# Patient Record
Sex: FEMALE | Race: ASIAN | NOT HISPANIC OR LATINO | URBAN - METROPOLITAN AREA
[De-identification: names, ages, dates, MRNs, and addresses within clinical notes are randomized per-mention and may not be internally consistent; named-entity substitution may affect disease eponyms.]

---

## 2020-05-05 ENCOUNTER — NURSE TRIAGE (OUTPATIENT)
Dept: NURSING | Facility: CLINIC | Age: 25
End: 2020-05-05

## 2020-05-05 NOTE — TELEPHONE ENCOUNTER
She has not been able to urinate fully since around 6 am this morning. She started feeling pain with urination around 10 am and since then she has only been able to urinate a little each time due to pain. She rates the pain a 7 and she says her bladder is very full, and she has been drinking a lot of water today. She is going to [facility] to get evaluated.    Madalyn Cat RN/ [facility]        Reason for Disposition    [1] Unable to urinate (or only a few drops) > 4 hours AND     [2] bladder feels very full (e.g., palpable bladder or strong urge to urinate)    Protocols used: URINARY SYMPTOMS-A-AH

## 2020-06-16 ENCOUNTER — RECORDS - HEALTHEAST (OUTPATIENT)
Dept: ADMINISTRATIVE | Facility: OTHER | Age: 25
End: 2020-06-16

## 2020-06-17 ENCOUNTER — HOSPITAL ENCOUNTER (OUTPATIENT)
Dept: MAMMOGRAPHY | Facility: CLINIC | Age: 25
Discharge: HOME | End: 2020-06-17
Attending: OBSTETRICS & GYNECOLOGY

## 2020-06-17 DIAGNOSIS — N63.10 BREAST MASS, RIGHT: ICD-10-CM

## 2021-03-18 ENCOUNTER — RECORDS - HEALTHEAST (OUTPATIENT)
Dept: ADMINISTRATIVE | Facility: OTHER | Age: 26
End: 2021-03-18

## 2021-03-18 ENCOUNTER — AMBULATORY - HEALTHEAST (OUTPATIENT)
Dept: SURGERY | Facility: AMBULATORY SURGERY CENTER | Age: 26
End: 2021-03-18

## 2021-03-18 DIAGNOSIS — Z11.59 ENCOUNTER FOR SCREENING FOR OTHER VIRAL DISEASES: ICD-10-CM

## 2021-04-19 ENCOUNTER — AMBULATORY - HEALTHEAST (OUTPATIENT)
Dept: LAB | Facility: CLINIC | Age: 26
End: 2021-04-19

## 2021-04-19 DIAGNOSIS — Z11.59 ENCOUNTER FOR SCREENING FOR OTHER VIRAL DISEASES: ICD-10-CM

## 2021-04-21 ENCOUNTER — COMMUNICATION - HEALTHEAST (OUTPATIENT)
Dept: SCHEDULING | Facility: CLINIC | Age: 26
End: 2021-04-21

## 2021-04-21 ENCOUNTER — RECORDS - HEALTHEAST (OUTPATIENT)
Dept: ADMINISTRATIVE | Facility: OTHER | Age: 26
End: 2021-04-21

## 2021-04-21 ASSESSMENT — MIFFLIN-ST. JEOR: SCORE: 1696.16

## 2021-04-22 ENCOUNTER — ANESTHESIA - HEALTHEAST (OUTPATIENT)
Dept: SURGERY | Facility: AMBULATORY SURGERY CENTER | Age: 26
End: 2021-04-22

## 2021-04-23 ENCOUNTER — SURGERY - HEALTHEAST (OUTPATIENT)
Dept: SURGERY | Facility: AMBULATORY SURGERY CENTER | Age: 26
End: 2021-04-23
Payer: MEDICAID

## 2021-06-05 VITALS
BODY MASS INDEX: 38.04 KG/M2 | BODY MASS INDEX: 40.24 KG/M2 | WEIGHT: 220 LBS | BODY MASS INDEX: 40.24 KG/M2 | BODY MASS INDEX: 38.04 KG/M2 | HEIGHT: 62 IN | WEIGHT: 220 LBS | HEIGHT: 62 IN

## 2021-07-04 NOTE — ADDENDUM NOTE
Addendum Note by David Evans LPN at 3/18/2021  1:13 PM     Author: David Evans LPN Service: -- Author Type: Licensed Nurse    Filed: 3/24/2021 10:21 AM Encounter Date: 3/18/2021 Status: Signed    : David Evans LPN (Licensed Nurse)    Addended by: KYUNG EVANS on: 3/24/2021 10:21 AM        Modules accepted: Orders

## 2022-01-12 VITALS — BODY MASS INDEX: 40.48 KG/M2 | HEIGHT: 62 IN | WEIGHT: 220 LBS
